# Patient Record
Sex: FEMALE | Race: WHITE | NOT HISPANIC OR LATINO | URBAN - METROPOLITAN AREA
[De-identification: names, ages, dates, MRNs, and addresses within clinical notes are randomized per-mention and may not be internally consistent; named-entity substitution may affect disease eponyms.]

---

## 2023-08-14 ENCOUNTER — HOSPITAL ENCOUNTER (OUTPATIENT)
Dept: DATA CONVERSION | Facility: HOSPITAL | Age: 38
Discharge: HOME | End: 2023-08-14

## 2023-08-14 DIAGNOSIS — D64.9 ANEMIA, UNSPECIFIED: ICD-10-CM

## 2023-08-14 DIAGNOSIS — K90.9 INTESTINAL MALABSORPTION, UNSPECIFIED (HHS-HCC): ICD-10-CM

## 2023-08-14 DIAGNOSIS — E53.8 DEFICIENCY OF OTHER SPECIFIED B GROUP VITAMINS: ICD-10-CM

## 2023-08-14 DIAGNOSIS — E55.9 VITAMIN D DEFICIENCY, UNSPECIFIED: ICD-10-CM

## 2023-08-14 DIAGNOSIS — Z98.84 BARIATRIC SURGERY STATUS: ICD-10-CM

## 2023-08-14 LAB
25(OH)D3 SERPL-MCNC: 23 NG/ML (ref 31–100)
ALBUMIN SERPL-MCNC: 4 GM/DL (ref 3.5–5)
ALBUMIN/GLOB SERPL: 1.4 RATIO (ref 1.5–3)
ALP BLD-CCNC: 63 U/L (ref 35–125)
ALT SERPL-CCNC: 11 U/L (ref 5–40)
ANION GAP SERPL CALCULATED.3IONS-SCNC: 9 MMOL/L (ref 0–19)
AST SERPL-CCNC: 12 U/L (ref 5–40)
BASOPHILS # BLD AUTO: 0.06 K/UL (ref 0–0.22)
BASOPHILS NFR BLD AUTO: 0.7 % (ref 0–1)
BILIRUB SERPL-MCNC: 0.4 MG/DL (ref 0.1–1.2)
BUN SERPL-MCNC: 18 MG/DL (ref 8–25)
BUN/CREAT SERPL: 25.7 RATIO (ref 8–21)
CALCIUM SERPL-MCNC: 9.2 MG/DL (ref 8.5–10.4)
CALCIUM SERPL-MCNC: 9.2 MG/DL (ref 8.5–10.4)
CHLORIDE SERPL-SCNC: 108 MMOL/L (ref 97–107)
CO2 SERPL-SCNC: 24 MMOL/L (ref 24–31)
CREAT SERPL-MCNC: 0.7 MG/DL (ref 0.4–1.6)
DEPRECATED RDW RBC AUTO: 40 FL (ref 37–54)
DIFFERENTIAL METHOD BLD: ABNORMAL
EOSINOPHIL # BLD AUTO: 0.18 K/UL (ref 0–0.45)
EOSINOPHIL NFR BLD: 2.2 % (ref 0–3)
ERYTHROCYTE [DISTWIDTH] IN BLOOD BY AUTOMATED COUNT: 13.4 % (ref 11.7–15)
FERRITIN SERPL-MCNC: 92 NG/ML (ref 13–150)
FOLATE SERPL-MCNC: 18.6 NG/ML (ref 4.2–19.9)
GFR SERPL CREATININE-BSD FRML MDRD: 116 ML/MIN/1.73 M2
GLOBULIN SER-MCNC: 2.8 G/DL (ref 1.9–3.7)
GLUCOSE SERPL-MCNC: 74 MG/DL (ref 65–99)
HCT VFR BLD AUTO: 39.8 % (ref 36–44)
HGB BLD-MCNC: 13 GM/DL (ref 12–15)
IMM GRANULOCYTES # BLD AUTO: 0.02 K/UL (ref 0–0.1)
IRON SATN MFR SERPL: 20.9 % (ref 12–50)
IRON SERPL-MCNC: 47 UG/DL (ref 30–160)
LYMPHOCYTES # BLD AUTO: 2.22 K/UL (ref 1.2–3.2)
LYMPHOCYTES NFR BLD MANUAL: 26.6 % (ref 20–40)
MCH RBC QN AUTO: 27 PG (ref 26–34)
MCHC RBC AUTO-ENTMCNC: 32.7 % (ref 31–37)
MCV RBC AUTO: 82.7 FL (ref 80–100)
MONOCYTES # BLD AUTO: 0.77 K/UL (ref 0–0.8)
MONOCYTES NFR BLD MANUAL: 9.2 % (ref 0–8)
NEUTROPHILS # BLD AUTO: 5.09 K/UL
NEUTROPHILS # BLD AUTO: 5.09 K/UL (ref 1.8–7.7)
NEUTROPHILS.IMMATURE NFR BLD: 0.2 % (ref 0–1)
NEUTS SEG NFR BLD: 61.1 % (ref 50–70)
NRBC BLD-RTO: 0 /100 WBC
PHOSPHATE SERPL-MCNC: 3.6 MG/DL (ref 2.5–4.5)
PLATELET # BLD AUTO: 382 K/UL (ref 150–450)
PMV BLD AUTO: 10.2 CU (ref 7–12.6)
POTASSIUM SERPL-SCNC: 4.4 MMOL/L (ref 3.4–5.1)
PROT SERPL-MCNC: 6.8 G/DL (ref 5.9–7.9)
PTH-INTACT SERPL-MCNC: 26 PG/ML (ref 15–65)
RBC # BLD AUTO: 4.81 M/UL (ref 4–4.9)
SODIUM SERPL-SCNC: 140 MMOL/L (ref 133–145)
TIBC SERPL-MCNC: 225 UG/DL (ref 228–428)
VIT B12 SERPL-MCNC: 961 PG/ML (ref 211–946)
WBC # BLD AUTO: 8.3 K/UL (ref 4.5–11)

## 2023-08-16 LAB
COPPER SERPL-MCNC: NORMAL UG/DL
ZINC SERPL-MCNC: NORMAL UG/ML

## 2023-08-18 LAB — VIT B1 BLD-MCNC: NORMAL UG/DL

## 2023-09-15 VITALS
DIASTOLIC BLOOD PRESSURE: 58 MMHG | SYSTOLIC BLOOD PRESSURE: 103 MMHG | BODY MASS INDEX: 30.7 KG/M2 | HEART RATE: 76 BPM | HEIGHT: 66 IN | WEIGHT: 191 LBS

## 2023-09-18 PROBLEM — R20.0 NUMBNESS: Status: ACTIVE | Noted: 2023-09-18

## 2023-09-18 PROBLEM — E86.1 HYPOVOLEMIA: Status: ACTIVE | Noted: 2023-09-18

## 2023-09-18 PROBLEM — N89.8 VAGINAL IRRITATION: Status: ACTIVE | Noted: 2023-09-18

## 2023-09-18 PROBLEM — E66.01 MORBID (SEVERE) OBESITY DUE TO EXCESS CALORIES (MULTI): Status: ACTIVE | Noted: 2023-09-18

## 2023-09-18 PROBLEM — G47.10 HYPERSOMNIA: Status: ACTIVE | Noted: 2023-09-18

## 2023-09-18 PROBLEM — G44.209 TENSION HEADACHE: Status: ACTIVE | Noted: 2023-09-18

## 2023-09-18 PROBLEM — M13.80 SERONEGATIVE ARTHRITIS: Status: ACTIVE | Noted: 2023-09-18

## 2023-09-18 PROBLEM — N93.9 ABNORMAL UTERINE BLEEDING: Status: ACTIVE | Noted: 2023-09-18

## 2023-09-18 PROBLEM — F32.9 MAJOR DEPRESSIVE DISORDER, SINGLE EPISODE, UNSPECIFIED: Status: ACTIVE | Noted: 2023-09-18

## 2023-09-18 PROBLEM — E78.5 HYPERLIPIDEMIA: Status: ACTIVE | Noted: 2023-09-18

## 2023-09-18 PROBLEM — B37.31 YEAST INFECTION OF THE VAGINA: Status: ACTIVE | Noted: 2023-09-18

## 2023-09-18 PROBLEM — G47.30 SLEEP APNEA: Status: ACTIVE | Noted: 2023-09-18

## 2023-09-18 PROBLEM — E55.9 VITAMIN D DEFICIENCY: Status: ACTIVE | Noted: 2023-09-18

## 2023-09-18 PROBLEM — E03.9 ACQUIRED HYPOTHYROIDISM: Status: ACTIVE | Noted: 2023-09-18

## 2023-09-18 PROBLEM — R20.9 SKIN SENSATION DISTURBANCE: Status: ACTIVE | Noted: 2023-09-18

## 2023-09-18 PROBLEM — O99.210 OBESITY AFFECTING PREGNANCY, ANTEPARTUM (HHS-HCC): Status: ACTIVE | Noted: 2023-09-18

## 2023-09-18 PROBLEM — Z90.49 HISTORY OF CHOLECYSTECTOMY: Status: ACTIVE | Noted: 2023-09-18

## 2023-09-18 PROBLEM — K90.9 ABNORMAL INTESTINAL ABSORPTION (HHS-HCC): Status: ACTIVE | Noted: 2023-09-18

## 2023-09-18 PROBLEM — D50.0 IRON DEFICIENCY ANEMIA DUE TO CHRONIC BLOOD LOSS: Status: ACTIVE | Noted: 2023-09-18

## 2023-09-18 PROBLEM — M62.89 PELVIC FLOOR DYSFUNCTION: Status: ACTIVE | Noted: 2023-09-18

## 2023-09-18 PROBLEM — Z85.3 HISTORY OF BREAST CANCER: Status: ACTIVE | Noted: 2023-09-18

## 2023-09-18 PROBLEM — N95.2 VAGINAL ATROPHY: Status: ACTIVE | Noted: 2023-09-18

## 2023-09-18 PROBLEM — K21.9 GASTROESOPHAGEAL REFLUX DISEASE: Status: ACTIVE | Noted: 2023-09-18

## 2023-09-18 PROBLEM — N92.1 MENORRHAGIA WITH IRREGULAR CYCLE: Status: ACTIVE | Noted: 2023-09-18

## 2023-09-18 PROBLEM — E21.3 HYPERPARATHYROIDISM (MULTI): Status: ACTIVE | Noted: 2023-09-18

## 2023-09-18 PROBLEM — E06.1 SUBACUTE THYROIDITIS: Status: ACTIVE | Noted: 2023-09-18

## 2023-09-18 PROBLEM — E05.00 THYROTOXICOSIS WITH DIFFUSE GOITER WITHOUT THYROTOXIC CRISIS OR STORM: Status: ACTIVE | Noted: 2023-09-18

## 2023-09-18 PROBLEM — E04.9 GOITER: Status: ACTIVE | Noted: 2023-09-18

## 2023-09-18 PROBLEM — N89.8 VAGINAL CYST: Status: ACTIVE | Noted: 2023-09-18

## 2023-09-18 RX ORDER — DROSPIRENONE AND ETHINYL ESTRADIOL 0.03MG-3MG
1 KIT ORAL DAILY
COMMUNITY
Start: 2021-12-28 | End: 2023-10-16

## 2023-09-18 RX ORDER — SERTRALINE HYDROCHLORIDE 25 MG/1
1 TABLET, FILM COATED ORAL DAILY
COMMUNITY
Start: 2022-03-19 | End: 2023-10-16

## 2023-09-18 RX ORDER — PNV NO.95/FERROUS FUM/FOLIC AC 28MG-0.8MG
TABLET ORAL
COMMUNITY
Start: 2021-11-04 | End: 2023-10-16

## 2023-09-18 RX ORDER — IBUPROFEN 200 MG
200 TABLET ORAL
COMMUNITY

## 2023-09-18 RX ORDER — BUPROPION HYDROCHLORIDE 100 MG/1
100 TABLET, EXTENDED RELEASE ORAL EVERY MORNING
COMMUNITY
End: 2023-10-16

## 2023-09-18 RX ORDER — ACETAMINOPHEN 325 MG/1
325 TABLET ORAL EVERY 4 HOURS
COMMUNITY

## 2023-09-18 RX ORDER — OMEPRAZOLE 40 MG/1
40 CAPSULE, DELAYED RELEASE ORAL
COMMUNITY
End: 2023-10-16

## 2023-09-18 RX ORDER — BUPROPION HYDROCHLORIDE 150 MG/1
150 TABLET ORAL DAILY
COMMUNITY
Start: 2022-03-17 | End: 2023-10-16

## 2023-09-18 RX ORDER — ERGOCALCIFEROL 1.25 MG/1
50000 CAPSULE ORAL
COMMUNITY
Start: 2021-04-09

## 2023-10-16 ENCOUNTER — OFFICE VISIT (OUTPATIENT)
Dept: PRIMARY CARE | Facility: CLINIC | Age: 38
End: 2023-10-16
Payer: COMMERCIAL

## 2023-10-16 ENCOUNTER — ANCILLARY PROCEDURE (OUTPATIENT)
Dept: RADIOLOGY | Facility: CLINIC | Age: 38
End: 2023-10-16
Payer: COMMERCIAL

## 2023-10-16 VITALS
BODY MASS INDEX: 31.09 KG/M2 | DIASTOLIC BLOOD PRESSURE: 68 MMHG | WEIGHT: 186.6 LBS | RESPIRATION RATE: 18 BRPM | SYSTOLIC BLOOD PRESSURE: 116 MMHG | TEMPERATURE: 97.5 F | HEIGHT: 65 IN | OXYGEN SATURATION: 96 % | HEART RATE: 73 BPM

## 2023-10-16 DIAGNOSIS — Z13.220 SCREENING FOR LIPID DISORDERS: ICD-10-CM

## 2023-10-16 DIAGNOSIS — M25.562 CHRONIC PAIN OF LEFT KNEE: ICD-10-CM

## 2023-10-16 DIAGNOSIS — Z00.00 WELL ADULT HEALTH CHECK: Primary | ICD-10-CM

## 2023-10-16 DIAGNOSIS — F41.9 ANXIETY: ICD-10-CM

## 2023-10-16 DIAGNOSIS — G89.29 CHRONIC PAIN OF LEFT KNEE: ICD-10-CM

## 2023-10-16 DIAGNOSIS — R53.83 OTHER FATIGUE: ICD-10-CM

## 2023-10-16 DIAGNOSIS — E55.9 VITAMIN D DEFICIENCY: ICD-10-CM

## 2023-10-16 DIAGNOSIS — F32.A DEPRESSION, UNSPECIFIED DEPRESSION TYPE: ICD-10-CM

## 2023-10-16 DIAGNOSIS — B37.2 YEAST DERMATITIS: ICD-10-CM

## 2023-10-16 PROCEDURE — 73562 X-RAY EXAM OF KNEE 3: CPT | Mod: 50

## 2023-10-16 PROCEDURE — 1036F TOBACCO NON-USER: CPT | Performed by: NURSE PRACTITIONER

## 2023-10-16 PROCEDURE — 99385 PREV VISIT NEW AGE 18-39: CPT | Performed by: NURSE PRACTITIONER

## 2023-10-16 RX ORDER — BUPROPION HYDROCHLORIDE 150 MG/1
150 TABLET ORAL EVERY MORNING
Qty: 30 TABLET | Refills: 1 | Status: SHIPPED | OUTPATIENT
Start: 2023-10-16 | End: 2023-12-21

## 2023-10-16 RX ORDER — NYSTATIN 100000 [USP'U]/G
1 POWDER TOPICAL 2 TIMES DAILY
Qty: 30 G | Refills: 11 | Status: SHIPPED | OUTPATIENT
Start: 2023-10-16 | End: 2024-10-15

## 2023-10-16 ASSESSMENT — PATIENT HEALTH QUESTIONNAIRE - PHQ9
1. LITTLE INTEREST OR PLEASURE IN DOING THINGS: SEVERAL DAYS
10. IF YOU CHECKED OFF ANY PROBLEMS, HOW DIFFICULT HAVE THESE PROBLEMS MADE IT FOR YOU TO DO YOUR WORK, TAKE CARE OF THINGS AT HOME, OR GET ALONG WITH OTHER PEOPLE: VERY DIFFICULT
2. FEELING DOWN, DEPRESSED OR HOPELESS: SEVERAL DAYS
SUM OF ALL RESPONSES TO PHQ9 QUESTIONS 1 AND 2: 2

## 2023-10-16 ASSESSMENT — ENCOUNTER SYMPTOMS: TINGLING: 1

## 2023-10-16 ASSESSMENT — PAIN SCALES - GENERAL: PAINLEVEL: 7

## 2023-10-16 NOTE — PROGRESS NOTES
"Subjective   Patient ID: Ju Fernandes is a 38 y.o. female who presents for Annual Exam (PT HERE FOR PHYSICAL. NO PAP. PT WOULD LIKE TO GET BACK ON DEPRESSION MED. /PT C/O ONGOING LEFT KNEE PAIN).HAS HAD GASTRIC BY [facility]ASS IN FEB LOST OVER 75 POUNDS  2YR OLS SLEEPING  HYST  NE MENSES    HERE  FOR CPE. HAS BEEN  OFF MED'S FOR 4 MONTHS. FOR DEPRESSION NOW SHE IS STRUGGLING ,HAD BEEN ON WELLBUTRIN 150. NOW NOTCHING REALLY NOT DOING WELL, WORRIES MORE OFTEN SAD.PT IS A STAY AT HOME MOM. HAS A GOOD SUPPORT SYSTEM  SUPPORTIVE. WORRIES ABOUT  THE  KIDS MONEY. STOPPED WATCHING NEWS. HAS LEFT KNNE PAIN  ONGOING FOR A FEW MONTH. SHARP STABBING , HAS  HAD GASTRIC BY PASS AND HAS LOST A LOT OF WEIGHT ,LOOKS GREAT .SEES DR GORDILLO    Knee Pain   The incident occurred more than 1 week ago. There was no injury mechanism. The pain is present in the left knee. The quality of the pain is described as aching, burning and stabbing. The pain is moderate. The pain has been Worsening since onset. Associated symptoms include tingling. She reports no foreign bodies present. She has tried ice, elevation and NSAIDs for the symptoms. The treatment provided no relief.        Review of Systems   Musculoskeletal:  Positive for arthralgias.   Neurological:  Positive for tingling.   Psychiatric/Behavioral:          INCREASING ANXIETY  MORE INCREASED SADNESS NOT SUCIDAL GOOD SUPPORT SYSTEM        Objective   /68   Pulse 73   Temp 36.4 °C (97.5 °F)   Resp 18   Ht 1.638 m (5' 4.5\")   Wt 84.6 kg (186 lb 9.6 oz)   LMP  (LMP Unknown) Comment: HYSTERECTOMY 5/2022  SpO2 96%   BMI 31.54 kg/m²     Physical Exam  Vitals reviewed.   Constitutional:       Appearance: Normal appearance.   HENT:      Head: Normocephalic.      Right Ear: Tympanic membrane normal.      Left Ear: Tympanic membrane normal.      Nose: Nose normal.   Cardiovascular:      Rate and Rhythm: Normal rate and regular rhythm.      Pulses: Normal pulses.      Heart sounds: " Normal heart sounds.   Pulmonary:      Effort: Pulmonary effort is normal.   Abdominal:      General: Abdomen is flat. Bowel sounds are normal.      Palpations: Abdomen is soft.   Musculoskeletal:         General: Tenderness present.      Cervical back: Normal range of motion.      Left lower leg: Edema present.      Comments: HAS PAIN AND TENDERNESS WITH LEFT KNEE NO SWELLING PAINFUL ROM   Skin:     General: Skin is warm and dry.   Neurological:      Mental Status: She is alert.         Assessment/Plan   Problem List Items Addressed This Visit             ICD-10-CM    Vitamin D deficiency E55.9    Relevant Orders    Vitamin D 25-Hydroxy,Total (for eval of Vitamin D levels)     Other Visit Diagnoses         Codes    Well adult health check    -  Primary Z00.00    Relevant Orders    Comprehensive Metabolic Panel    TSH with reflex to Free T4 if abnormal    Chronic pain of left knee     M25.562, G89.29    Relevant Orders    Referral to Physical Therapy    Referral to Orthopaedic Surgery    XR knee 3 views bilateral    Anxiety     F41.9    Relevant Medications    buPROPion XL (Wellbutrin XL) 150 mg 24 hr tablet    Depression, unspecified depression type     F32.A    Yeast dermatitis     B37.2    Relevant Medications    nystatin (Mycostatin) 100,000 unit/gram powder    Screening for lipid disorders     Z13.220    Relevant Orders    Lipid Panel    Other fatigue     R53.83    Relevant Orders    CBC and Auto Differential

## 2023-10-17 ASSESSMENT — ENCOUNTER SYMPTOMS: ARTHRALGIAS: 1

## 2023-10-17 NOTE — PATIENT INSTRUCTIONS
DISCUSSED RESTARTING MEDICATION CASSIUS CALL OR RETURN IN 4 WEEKS FOR MED CHECK SEE ORTHO AND PT FOR L KNEE LOOKS GREAT

## 2023-12-21 DIAGNOSIS — F41.9 ANXIETY: ICD-10-CM

## 2023-12-21 RX ORDER — BUPROPION HYDROCHLORIDE 150 MG/1
150 TABLET ORAL EVERY MORNING
Qty: 30 TABLET | Refills: 1 | Status: SHIPPED | OUTPATIENT
Start: 2023-12-21 | End: 2024-02-28

## 2024-02-07 NOTE — PROGRESS NOTES
Subjective   Patient ID: Ju Fernandes is a 38 y.o. female who presents for No chief complaint on file..  HPI  1 YR FUV SLEEVE  START WT: 254 IDEAL WT: 153  START EXCESS WT: 101 TOTAL WT LOSS: 74 EWL 73% HT: 65.5   Review of Systems  Bariatric Surgery F/U:          Seen at ER after surgery? No.  Hospital admission? No.  Bariatric reoperation? No.  Bariatric intervention? No.  Was anticoagulation initiated for presumed/confirmed Vein Thrombosis/PE? No.  Was an incisional hernia noted on exam? No.  Sleep Apnea? No.  Still using C-PAP? No.  GERD req. meds? No.  Hyperlipidemia? No.  Still taking Cholesterol med? No.  Hypertension? No.  Still taking HTN med? No.  Number of Anti-hypertensive Medications: 0.  Diabetes? No.  Still taking DM med? No.  Current DM medication type: _____.         CONSTITUTIONAL:          Chills No.  Fatigue No.  Fever No.         CARDIOLOGY:          admits to occasional   dizziness when goes from sitting to standing doctors are aware, chest pain, palpitations, shortness of breath.         RESPIRATORY:          Negative for chest congestion, cough, wheezing.         GASTROENTEROLOGY:          Food Intolerance No.  Acid reflux No.  Abdominal pain No.  Black stools No.  Constipation No.  Diarrhea No.  Loss of appetite no.  Nausea No.  Vomiting No.         UROLOGY:          Negative for dysuria, urinary urgency, kidney stones.         PSYCHOLOGY:          Negative for depression, anxiety, high stress level.   Objective   Physical Exam    Assessment/Plan            Josselyn Rodriguez LPN 02/07/24 3:35 PM

## 2024-02-12 ENCOUNTER — LAB (OUTPATIENT)
Dept: LAB | Facility: LAB | Age: 39
End: 2024-02-12
Payer: COMMERCIAL

## 2024-02-12 DIAGNOSIS — E61.1 IRON DEFICIENCY: ICD-10-CM

## 2024-02-12 DIAGNOSIS — E55.9 VITAMIN D DEFICIENCY, UNSPECIFIED: ICD-10-CM

## 2024-02-12 DIAGNOSIS — K90.9 INTESTINAL MALABSORPTION, UNSPECIFIED (HHS-HCC): Primary | ICD-10-CM

## 2024-02-12 DIAGNOSIS — E21.3 HYPERPARATHYROIDISM, UNSPECIFIED (MULTI): ICD-10-CM

## 2024-02-12 DIAGNOSIS — E53.8 DEFICIENCY OF OTHER SPECIFIED B GROUP VITAMINS: ICD-10-CM

## 2024-02-12 LAB
25(OH)D3 SERPL-MCNC: 62 NG/ML (ref 31–100)
ALBUMIN SERPL-MCNC: 4.4 G/DL (ref 3.5–5)
ALP BLD-CCNC: 67 U/L (ref 35–125)
ALT SERPL-CCNC: 12 U/L (ref 5–40)
ANION GAP SERPL CALC-SCNC: 12 MMOL/L
AST SERPL-CCNC: 13 U/L (ref 5–40)
BASOPHILS # BLD AUTO: 0.08 X10*3/UL (ref 0–0.1)
BASOPHILS NFR BLD AUTO: 0.7 %
BILIRUB SERPL-MCNC: 0.3 MG/DL (ref 0.1–1.2)
BUN SERPL-MCNC: 20 MG/DL (ref 8–25)
CALCIUM SERPL-MCNC: 9.2 MG/DL (ref 8.5–10.4)
CHLORIDE SERPL-SCNC: 102 MMOL/L (ref 97–107)
CO2 SERPL-SCNC: 23 MMOL/L (ref 24–31)
CREAT SERPL-MCNC: 0.8 MG/DL (ref 0.4–1.6)
EGFRCR SERPLBLD CKD-EPI 2021: >90 ML/MIN/1.73M*2
EOSINOPHIL # BLD AUTO: 0.21 X10*3/UL (ref 0–0.7)
EOSINOPHIL NFR BLD AUTO: 1.8 %
ERYTHROCYTE [DISTWIDTH] IN BLOOD BY AUTOMATED COUNT: 13 % (ref 11.5–14.5)
FERRITIN SERPL-MCNC: 107 NG/ML (ref 13–150)
FOLATE SERPL-MCNC: >20 NG/ML (ref 4.2–19.9)
GLUCOSE SERPL-MCNC: 98 MG/DL (ref 65–99)
HCT VFR BLD AUTO: 41.5 % (ref 36–46)
HGB BLD-MCNC: 13.4 G/DL (ref 12–16)
IMM GRANULOCYTES # BLD AUTO: 0.03 X10*3/UL (ref 0–0.7)
IMM GRANULOCYTES NFR BLD AUTO: 0.3 % (ref 0–0.9)
IRON SERPL-MCNC: 52 UG/DL (ref 30–160)
LYMPHOCYTES # BLD AUTO: 2.59 X10*3/UL (ref 1.2–4.8)
LYMPHOCYTES NFR BLD AUTO: 22.2 %
MCH RBC QN AUTO: 27.2 PG (ref 26–34)
MCHC RBC AUTO-ENTMCNC: 32.3 G/DL (ref 32–36)
MCV RBC AUTO: 84 FL (ref 80–100)
MONOCYTES # BLD AUTO: 0.75 X10*3/UL (ref 0.1–1)
MONOCYTES NFR BLD AUTO: 6.4 %
NEUTROPHILS # BLD AUTO: 8.02 X10*3/UL (ref 1.2–7.7)
NEUTROPHILS NFR BLD AUTO: 68.6 %
NRBC BLD-RTO: 0 /100 WBCS (ref 0–0)
PLATELET # BLD AUTO: 416 X10*3/UL (ref 150–450)
POTASSIUM SERPL-SCNC: 4.6 MMOL/L (ref 3.4–5.1)
PROT SERPL-MCNC: 6.8 G/DL (ref 5.9–7.9)
PTH-INTACT SERPL-MCNC: 53.5 PG/ML (ref 18.5–88)
RBC # BLD AUTO: 4.93 X10*6/UL (ref 4–5.2)
SODIUM SERPL-SCNC: 137 MMOL/L (ref 133–145)
VIT B12 SERPL-MCNC: 1541 PG/ML (ref 211–946)
WBC # BLD AUTO: 11.7 X10*3/UL (ref 4.4–11.3)

## 2024-02-12 PROCEDURE — 83540 ASSAY OF IRON: CPT

## 2024-02-12 PROCEDURE — 85025 COMPLETE CBC W/AUTO DIFF WBC: CPT

## 2024-02-12 PROCEDURE — 82746 ASSAY OF FOLIC ACID SERUM: CPT

## 2024-02-12 PROCEDURE — 80053 COMPREHEN METABOLIC PANEL: CPT

## 2024-02-12 PROCEDURE — 84425 ASSAY OF VITAMIN B-1: CPT

## 2024-02-12 PROCEDURE — 83970 ASSAY OF PARATHORMONE: CPT

## 2024-02-12 PROCEDURE — 82728 ASSAY OF FERRITIN: CPT

## 2024-02-12 PROCEDURE — 82525 ASSAY OF COPPER: CPT

## 2024-02-12 PROCEDURE — 82607 VITAMIN B-12: CPT

## 2024-02-12 PROCEDURE — 36415 COLL VENOUS BLD VENIPUNCTURE: CPT

## 2024-02-12 PROCEDURE — 84630 ASSAY OF ZINC: CPT

## 2024-02-12 PROCEDURE — 82306 VITAMIN D 25 HYDROXY: CPT

## 2024-02-15 ENCOUNTER — NUTRITION (OUTPATIENT)
Dept: SURGERY | Facility: CLINIC | Age: 39
End: 2024-02-15

## 2024-02-15 ENCOUNTER — OFFICE VISIT (OUTPATIENT)
Dept: SURGERY | Facility: CLINIC | Age: 39
End: 2024-02-15
Payer: COMMERCIAL

## 2024-02-15 DIAGNOSIS — E53.8 B12 DEFICIENCY: ICD-10-CM

## 2024-02-15 DIAGNOSIS — E55.9 VITAMIN D DEFICIENCY: ICD-10-CM

## 2024-02-15 DIAGNOSIS — Z90.3 S/P GASTRIC SLEEVE PROCEDURE: ICD-10-CM

## 2024-02-15 DIAGNOSIS — K90.9 ABNORMAL INTESTINAL ABSORPTION (HHS-HCC): Primary | ICD-10-CM

## 2024-02-15 DIAGNOSIS — E21.3 HYPERPARATHYROIDISM (MULTI): ICD-10-CM

## 2024-02-15 LAB
COPPER SERPL-MCNC: 104.2 UG/DL (ref 80–155)
ZINC SERPL-MCNC: 64.4 UG/DL (ref 60–120)

## 2024-02-15 PROCEDURE — 99214 OFFICE O/P EST MOD 30 MIN: CPT | Performed by: SURGERY

## 2024-02-15 PROCEDURE — 1036F TOBACCO NON-USER: CPT | Performed by: SURGERY

## 2024-02-15 RX ORDER — BISMUTH SUBSALICYLATE 262 MG
1 TABLET,CHEWABLE ORAL DAILY
COMMUNITY

## 2024-02-15 ASSESSMENT — PAIN SCALES - GENERAL: PAINLEVEL: 6

## 2024-02-15 NOTE — H&P
History Of Present Illness  Ju Fernandes is a 38 y.o. woman here for one year follow up from [facility].  She denies GERD.  She has hunger.  Ju feels full with 1/2 cup.  She is following the rules.  She joined [employer] and works out 4 days.  She does cardio 30 minutes and then lifts weights.  Ju takes bariatric mvi, calcium citrate 500mg chews bid.      START WT: 254 IDEAL WT: 153  START EXCESS WT: 101 TOTAL WT LOSS: 74   EWL   73     % HT: 65.5    Past Medical History  Past Medical History:   Diagnosis Date    Encounter for full-term uncomplicated delivery      (spontaneous vaginal delivery)    Encounter for immunization     COVID-19 vaccine administered    Other specified diseases and conditions complicating pregnancy 08/15/2016    Systemic lupus complicating pregnancy    Personal history of other diseases of the musculoskeletal system and connective tissue 10/15/2020    History of fibromyalgia    Personal history of other endocrine, nutritional and metabolic disease 2020    History of thyroid disease    Systemic lupus erythematosus, unspecified (CMS/[facility]) 2016    Lupus       Surgical History  Past Surgical History:   Procedure Laterality Date    OTHER SURGICAL HISTORY  10/15/2020    Cholecystectomy    OTHER SURGICAL HISTORY  10/15/2020    Tonsillectomy    OTHER SURGICAL HISTORY  2022    Intrauterine device removal    OTHER SURGICAL HISTORY  2022    Endometrial ablation    OTHER SURGICAL HISTORY  2023    GASTRIC SLEEVE        Social History  She reports that she has never smoked. She has never used smokeless tobacco. She reports that she does not currently use alcohol. She reports that she does not use drugs.    Family History  Family History   Problem Relation Name Age of Onset    Diabetes Mother      Breast cancer Mother          currently in remission    No Known Problems Father      Anxiety disorder Sister      Depression Sister      Thyroid cancer Maternal Grandmother       Kidney cancer Maternal Grandmother      No Known Problems Paternal Grandmother      No Known Problems Paternal Grandfather      Kidney failure Other MIsc     Skin cancer Other MIsc     Diabetes Other MIsc     Hypertension Other MIsc     Depression Other MIsc     Fibromyalgia Other MIsc         Allergies  Moxifloxacin    Review of Systems   Bariatric Surgery F/U:          Seen at ER after surgery? No.  Hospital admission? No.  Bariatric reoperation? No.  Bariatric intervention? No.  Was anticoagulation initiated for presumed/confirmed Vein Thrombosis/PE? No.  Was an incisional hernia noted on exam? No.  Sleep Apnea? No.  Still using C-PAP? No.  GERD req. meds? No.  Hyperlipidemia? No.  Still taking Cholesterol med? No.  Hypertension? No.  Still taking HTN med? No.  Number of Anti-hypertensive Medications: 0.  Diabetes? No.  Still taking DM med? No.  Current DM medication type: _____.         CONSTITUTIONAL:          Chills No.  Fatigue No.  Fever No.         CARDIOLOGY:          admits to occasional   dizziness when goes from sitting to standing doctors are aware, chest pain, palpitations, shortness of breath.         RESPIRATORY:          Negative for chest congestion, cough, wheezing.         GASTROENTEROLOGY:          Food Intolerance No.  Acid reflux No.  Abdominal pain No.  Black stools No.  Constipation No.  Diarrhea No.  Loss of appetite no.  Nausea No.  Vomiting No.         UROLOGY:          Negative for dysuria, urinary urgency, kidney stones.         PSYCHOLOGY:          Negative for depression, anxiety, high stress level.            Last Recorded Vitals  There were no vitals taken for this visit.    Relevant Results        Latest Reference Range & Units 02/12/24 13:20   GLUCOSE 65 - 99 mg/dL 98   SODIUM 133 - 145 mmol/L 137   POTASSIUM 3.4 - 5.1 mmol/L 4.6   CHLORIDE 97 - 107 mmol/L 102   Bicarbonate 24 - 31 mmol/L 23 (L)   Anion Gap <=19 mmol/L 12   Blood Urea Nitrogen 8 - 25 mg/dL 20   Creatinine 0.40 -  1.60 mg/dL 0.80   EGFR >60 mL/min/1.73m*2 >90   Calcium 8.5 - 10.4 mg/dL 9.2   Albumin 3.5 - 5.0 g/dL 4.4   Alkaline Phosphatase 35 - 125 U/L 67   ALT 5 - 40 U/L 12   AST 5 - 40 U/L 13   Bilirubin Total 0.1 - 1.2 mg/dL 0.3   FERRITIN 13 - 150 ng/mL 107   FOLATE 4.2 - 19.9 ng/mL >20.0 (H)   Total Protein 5.9 - 7.9 g/dL 6.8   IRON 30 - 160 ug/dL 52   Vitamin B12 211 - 946 pg/mL 1,541 (H)   Parathyroid Hormone, Intact 18.5 - 88.0 pg/mL 53.5   Vitamin D, 25-Hydroxy, Total 31 - 100 ng/mL 62   WBC 4.4 - 11.3 x10*3/uL 11.7 (H)   nRBC 0.0 - 0.0 /100 WBCs 0.0   RBC 4.00 - 5.20 x10*6/uL 4.93   HEMOGLOBIN 12.0 - 16.0 g/dL 13.4   HEMATOCRIT 36.0 - 46.0 % 41.5   MCV 80 - 100 fL 84   MCH 26.0 - 34.0 pg 27.2   MCHC 32.0 - 36.0 g/dL 32.3   RED CELL DISTRIBUTION WIDTH 11.5 - 14.5 % 13.0   Platelets 150 - 450 x10*3/uL 416   Neutrophils % 40.0 - 80.0 % 68.6   Immature Granulocytes %, Automated 0.0 - 0.9 % 0.3   Lymphocytes % 13.0 - 44.0 % 22.2   Monocytes % 2.0 - 10.0 % 6.4   Eosinophils % 0.0 - 6.0 % 1.8   Basophils % 0.0 - 2.0 % 0.7   Neutrophils Absolute 1.20 - 7.70 x10*3/uL 8.02 (H)   Immature Granulocytes Absolute, Automated 0.00 - 0.70 x10*3/uL 0.03   Lymphocytes Absolute 1.20 - 4.80 x10*3/uL 2.59   Monocytes Absolute 0.10 - 1.00 x10*3/uL 0.75   Eosinophils Absolute 0.00 - 0.70 x10*3/uL 0.21   Basophils Absolute 0.00 - 0.10 x10*3/uL 0.08   (L): Data is abnormally low  (H): Data is abnormally high  Assessment/Plan   Problem List Items Addressed This Visit             ICD-10-CM       Endocrine/Metabolic    Hyperparathyroidism (CMS/HCC) E21.3    Vitamin D deficiency E55.9    S/P gastric sleeve procedure Z90.3    B12 deficiency E53.8       Gastrointestinal and Abdominal    Abnormal intestinal absorption - Primary K90.9       We reviewed the rules necessary for long term success after weight loss surgery.  We discussed the need for lifelong nutritional supplementation after weight loss surgery and they were given a handout.  We will  obtain labwork at next visit.  We discussed the importance of excercising with moderate intensity a minimum of five days per week for at least 30 minutes.         Darian Rushing MD

## 2024-02-15 NOTE — PROGRESS NOTES
Bariatric Post-Op Follow Up Appointment: 1 yr sleeve     Current Weight: 180 lb  Current BMI: 29.50  Percentage of weight loss achieved: 73    Dietary Intake: protein shake + 2 meals   Breakfast- coffee with an added protein shake   Lunch- deli meat and cheese stuffed bell pepper, OR a healthy choice meal OR leftovers from dinner   Dinner- taco pasta (taco seasoned pasta with ground beef)   Volume of food at a time: 3-4 oz protein and 1/4 cup vegetables  Snacks: a cheese stick on occ   Beverages: water, coffee with protein shake  Alcohol Intake: on occasion  Do you drink with your meals? No   Current Exercise: cardio/weight lifting at [employer], 4x/week for an hour   Vitamins/minerals: Sh[employer] Leaf MVI (provides 45mg iron, 75 mcg vitamin D, 2mg copper, 1,000 mcg vitamin B12) and calcium citrate   Food intolerances? No   Any decrease in energy levels? No   Any questions or concerns? Addressed all questions.       Twyla Mathias RD, LDN  Registered Dietitian, Licensed Dietitian Nutritionist

## 2024-02-16 VITALS
BODY MASS INDEX: 28.93 KG/M2 | DIASTOLIC BLOOD PRESSURE: 68 MMHG | WEIGHT: 180 LBS | HEIGHT: 66 IN | SYSTOLIC BLOOD PRESSURE: 119 MMHG | HEART RATE: 76 BPM

## 2024-02-16 LAB — VIT B1 PYROPHOSHATE BLD-SCNC: 191 NMOL/L (ref 70–180)

## 2024-02-16 ASSESSMENT — PATIENT HEALTH QUESTIONNAIRE - PHQ9
2. FEELING DOWN, DEPRESSED OR HOPELESS: NOT AT ALL
1. LITTLE INTEREST OR PLEASURE IN DOING THINGS: NOT AT ALL
SUM OF ALL RESPONSES TO PHQ9 QUESTIONS 1 AND 2: 0

## 2024-02-16 ASSESSMENT — COLUMBIA-SUICIDE SEVERITY RATING SCALE - C-SSRS
6. HAVE YOU EVER DONE ANYTHING, STARTED TO DO ANYTHING, OR PREPARED TO DO ANYTHING TO END YOUR LIFE?: NO
2. HAVE YOU ACTUALLY HAD ANY THOUGHTS OF KILLING YOURSELF?: NO
1. IN THE PAST MONTH, HAVE YOU WISHED YOU WERE DEAD OR WISHED YOU COULD GO TO SLEEP AND NOT WAKE UP?: NO

## 2024-02-28 DIAGNOSIS — F41.9 ANXIETY: ICD-10-CM

## 2024-02-28 PROBLEM — Z90.3 S/P GASTRIC SLEEVE PROCEDURE: Status: ACTIVE | Noted: 2024-02-28

## 2024-02-28 PROBLEM — E53.8 B12 DEFICIENCY: Status: ACTIVE | Noted: 2024-02-28

## 2024-02-28 RX ORDER — BUPROPION HYDROCHLORIDE 150 MG/1
150 TABLET ORAL EVERY MORNING
Qty: 30 TABLET | Refills: 1 | Status: SHIPPED | OUTPATIENT
Start: 2024-02-28 | End: 2024-03-25

## 2024-03-25 DIAGNOSIS — F41.9 ANXIETY: ICD-10-CM

## 2024-03-25 RX ORDER — BUPROPION HYDROCHLORIDE 150 MG/1
150 TABLET ORAL EVERY MORNING
Qty: 30 TABLET | Refills: 1 | Status: SHIPPED | OUTPATIENT
Start: 2024-03-25 | End: 2024-05-24

## 2024-03-25 RX ORDER — BUPROPION HYDROCHLORIDE 150 MG/1
150 TABLET ORAL EVERY MORNING
Qty: 30 TABLET | Refills: 1 | Status: SHIPPED | OUTPATIENT
Start: 2024-03-25 | End: 2024-03-25

## 2024-04-11 ENCOUNTER — EVALUATION (OUTPATIENT)
Dept: PHYSICAL THERAPY | Facility: CLINIC | Age: 39
End: 2024-04-11
Payer: COMMERCIAL

## 2024-04-11 DIAGNOSIS — G89.29 OTHER CHRONIC PAIN: ICD-10-CM

## 2024-04-11 DIAGNOSIS — M25.562 PAIN IN LEFT KNEE: ICD-10-CM

## 2024-04-11 PROCEDURE — 97110 THERAPEUTIC EXERCISES: CPT | Mod: GP | Performed by: PHYSICAL THERAPIST

## 2024-04-11 PROCEDURE — 97161 PT EVAL LOW COMPLEX 20 MIN: CPT | Mod: GP | Performed by: PHYSICAL THERAPIST

## 2024-04-11 PROCEDURE — 97530 THERAPEUTIC ACTIVITIES: CPT | Mod: GP | Performed by: PHYSICAL THERAPIST

## 2024-04-11 ASSESSMENT — ENCOUNTER SYMPTOMS
DEPRESSION: 0
LOSS OF SENSATION IN FEET: 0
OCCASIONAL FEELINGS OF UNSTEADINESS: 0

## 2024-04-11 NOTE — PROGRESS NOTES
Physical Therapy Evaluation and Treatment      Patient Name: Ju Fernandes  MRN: [id number]  Today's Date: 4/11/2024  Time Calculation  Start Time: 1600  Stop Time: 1639  Time Calculation (min): 39 min  PT Therapeutic Procedures Time Entry  Therapeutic Exercise Time Entry: 15  Therapeutic Activity Time Entry: 10      Insurance:  Visit number: 1 of 5  Authorization info: 40V/yr  Insurance Type: Payor: "[employer] / Plan: "[employer] HEALTH PLAN / Product Type: *No Product type* /     Current Problem:   1. Pain in left knee  PT eval and treat    Follow Up In Physical Therapy      2. Other chronic pain  PT eval and treat          Subjective    General:  Pt states her left knee is very painful and steadily getting worse for the past year. Xray normal, MRI scheduled. Surgeon thinks she needs VMO strengthening. Left knee pops and cracks and finds it is hard to carry 5 y.o. (MOM of 6). No specific injury, but thinks it is due to carrying her kids over the years. 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 Pain mainly when left knee crossed or bent too long, or when putting weight on it. No numbness or tingling. Pain mainly behind left kneecap. Treadmill walking is OK, but pain with walking outside on harder surface. Has a gym membership.     Would like to become more active as she had gastric sleeve surgery 1 year ago.     Precautions: None  Pain: 6/10      Objective   ROM   Left knee AROM:  Flex 118 deg  Ext 0 deg     MMT   Left LE strength:  Hip flex 4/5  Knee flex 4+/5  Knee ext 4/5  *Pain with strength testing    Palpation   TTP left patella.   TTP left medial knee joint line.   TTP left distal quad, especially VMO.     No edema.      Flexibility  Mild tightness in left quad, increased distally.     Special Tests   Knee: Mague: Left negative, Valgus: Left negative, Varus: Left negative, PF Grind: Left positive     Gait   Ambulates with mildly decreased candice. Decreased stance time on left with intermittent hyperextension.    Stairs   Ascends and  descends steps with reciprocal pattern using 1 rail with pain    Outcome Measures:  LEFS: 47/80    Treatments:  Therapeutic Exercise:  SLR with ER  Bridge  LAQ with ER  Mini squat    Therapeutic activity:  Educated pt on eval findings and POC  Educated pt on anatomy of knee.      Assessment   Assessment:   Pt is a 38 y.o. female with left patellofemoral syndrome. Pt with gait deficits, reduced LE strength, posture deficits, and flexibility restrictions. Pt will benefit from skilled PT to address the above deficits for return to full activities.       Low complexity due to patient's clinical presentation being stable and uncomplicated by any significant comorbidities that may affect rehab tolerance and progression.       Plan:   Treatment/Interventions: Education/ Instruction, Gait training, Manual therapy, Neuromuscular re-education, Self care/ home management, Therapeutic activities, Therapeutic exercises, Taping techniques  PT Plan: Skilled PT  PT Frequency: 1 time per week  Duration: 4 more visits  Rehab Potential: Good  Plan of Care Agreement: Patient      Goals:   Active       Mobility       Goal 1       Start:  04/11/24    Expected End:  07/10/24       Pt will improve left LE strength to 5/5 to improve IADLs.         Goal 2       Start:  04/11/24    Expected End:  07/10/24       Pt will improve LE flexibility to WNL to improve IADLs.            Pain       Goal 1       Start:  04/11/24    Expected End:  07/10/24       Pt will perform all caretaker tasks with 0/10 pain.         Goal 2       Start:  04/11/24    Expected End:  07/10/24       Pt will perform all housework with 0/10 pain

## 2024-04-19 ENCOUNTER — HOSPITAL ENCOUNTER (OUTPATIENT)
Dept: RADIOLOGY | Facility: HOSPITAL | Age: 39
Discharge: HOME | End: 2024-04-19
Payer: COMMERCIAL

## 2024-04-19 DIAGNOSIS — S83.222A PERIPHERAL TEAR OF MEDIAL MENISCUS, CURRENT INJURY, LEFT KNEE, INITIAL ENCOUNTER: ICD-10-CM

## 2024-04-19 PROCEDURE — 73721 MRI JNT OF LWR EXTRE W/O DYE: CPT | Mod: LT

## 2024-04-19 PROCEDURE — 73721 MRI JNT OF LWR EXTRE W/O DYE: CPT | Mod: LEFT SIDE | Performed by: RADIOLOGY

## 2024-05-08 ENCOUNTER — DOCUMENTATION (OUTPATIENT)
Dept: PHYSICAL THERAPY | Facility: CLINIC | Age: 39
End: 2024-05-08
Payer: COMMERCIAL

## 2024-05-08 NOTE — PROGRESS NOTES
Physical Therapy    Discharge Summary    Name: Ju Fernandes  MRN: [id number]  : 1985  Date: 24    Discharge Summary: PT    Discharge Information: Date of evaluation 2024    Therapy Summary: Pt only attended initial eval.    Discharge Status: Return to PCP as needed.     Rehab Discharge Reason: Failed to schedule and/or keep follow-up appointment(s)

## 2024-08-15 ENCOUNTER — APPOINTMENT (OUTPATIENT)
Dept: SURGERY | Facility: CLINIC | Age: 39
End: 2024-08-15
Payer: COMMERCIAL

## 2024-08-19 ENCOUNTER — APPOINTMENT (OUTPATIENT)
Dept: SURGERY | Facility: CLINIC | Age: 39
End: 2024-08-19
Payer: COMMERCIAL

## 2024-10-02 DIAGNOSIS — F41.9 ANXIETY: ICD-10-CM

## 2024-10-02 RX ORDER — BUPROPION HYDROCHLORIDE 150 MG/1
150 TABLET ORAL EVERY MORNING
Qty: 90 TABLET | Refills: 1 | Status: SHIPPED | OUTPATIENT
Start: 2024-10-02 | End: 2025-03-31

## 2024-10-14 DIAGNOSIS — E55.9 VITAMIN D DEFICIENCY, UNSPECIFIED: ICD-10-CM

## 2024-10-14 RX ORDER — ERGOCALCIFEROL 1.25 MG/1
CAPSULE ORAL
Qty: 12 CAPSULE | Refills: 3 | Status: SHIPPED | OUTPATIENT
Start: 2024-10-14

## 2025-02-13 ENCOUNTER — APPOINTMENT (OUTPATIENT)
Dept: SURGERY | Facility: CLINIC | Age: 40
End: 2025-02-13
Payer: COMMERCIAL

## 2025-02-20 ENCOUNTER — OFFICE VISIT (OUTPATIENT)
Dept: PRIMARY CARE | Facility: CLINIC | Age: 40
End: 2025-02-20
Payer: COMMERCIAL

## 2025-02-20 VITALS
HEIGHT: 65 IN | WEIGHT: 188.8 LBS | DIASTOLIC BLOOD PRESSURE: 80 MMHG | RESPIRATION RATE: 18 BRPM | TEMPERATURE: 97.8 F | SYSTOLIC BLOOD PRESSURE: 122 MMHG | BODY MASS INDEX: 31.46 KG/M2 | OXYGEN SATURATION: 98 % | HEART RATE: 69 BPM

## 2025-02-20 DIAGNOSIS — Z13.21 ENCOUNTER FOR VITAMIN DEFICIENCY SCREENING: ICD-10-CM

## 2025-02-20 DIAGNOSIS — E55.9 VITAMIN D DEFICIENCY, UNSPECIFIED: ICD-10-CM

## 2025-02-20 DIAGNOSIS — Z13.220 ENCOUNTER FOR SCREENING FOR LIPID DISORDER: ICD-10-CM

## 2025-02-20 DIAGNOSIS — E55.9 VITAMIN D DEFICIENCY: ICD-10-CM

## 2025-02-20 DIAGNOSIS — F41.9 ANXIETY: ICD-10-CM

## 2025-02-20 DIAGNOSIS — Z11.59 ENCOUNTER FOR HEPATITIS C SCREENING TEST FOR LOW RISK PATIENT: ICD-10-CM

## 2025-02-20 DIAGNOSIS — Z00.00 WELL ADULT EXAM: ICD-10-CM

## 2025-02-20 DIAGNOSIS — Z80.3 FAMILY HISTORY OF BREAST CANCER: Primary | ICD-10-CM

## 2025-02-20 DIAGNOSIS — Z13.29 SCREENING FOR THYROID DISORDER: ICD-10-CM

## 2025-02-20 DIAGNOSIS — N89.8 VAGINAL DRYNESS: ICD-10-CM

## 2025-02-20 DIAGNOSIS — Z98.84 H/O GASTRIC BYPASS: ICD-10-CM

## 2025-02-20 PROCEDURE — 99213 OFFICE O/P EST LOW 20 MIN: CPT | Performed by: NURSE PRACTITIONER

## 2025-02-20 PROCEDURE — 99395 PREV VISIT EST AGE 18-39: CPT | Performed by: NURSE PRACTITIONER

## 2025-02-20 PROCEDURE — 3008F BODY MASS INDEX DOCD: CPT | Performed by: NURSE PRACTITIONER

## 2025-02-20 RX ORDER — ESTRADIOL 10 UG/1
10 TABLET, FILM COATED VAGINAL DAILY
Qty: 14 TABLET | Refills: 0 | Status: SHIPPED | OUTPATIENT
Start: 2025-02-20

## 2025-02-20 RX ORDER — SERTRALINE HYDROCHLORIDE 50 MG/1
50 TABLET, FILM COATED ORAL DAILY
Qty: 30 TABLET | Refills: 1 | Status: SHIPPED | OUTPATIENT
Start: 2025-02-20 | End: 2025-04-21

## 2025-02-20 RX ORDER — ESTRADIOL 10 UG/1
TABLET, FILM COATED VAGINAL
Qty: 32 TABLET | Refills: 3 | Status: SHIPPED | OUTPATIENT
Start: 2025-02-20

## 2025-02-20 RX ORDER — ERGOCALCIFEROL 1.25 MG/1
50000 CAPSULE ORAL WEEKLY
Qty: 12 CAPSULE | Refills: 3 | Status: SHIPPED | OUTPATIENT
Start: 2025-02-20

## 2025-02-20 ASSESSMENT — PATIENT HEALTH QUESTIONNAIRE - PHQ9
1. LITTLE INTEREST OR PLEASURE IN DOING THINGS: SEVERAL DAYS
10. IF YOU CHECKED OFF ANY PROBLEMS, HOW DIFFICULT HAVE THESE PROBLEMS MADE IT FOR YOU TO DO YOUR WORK, TAKE CARE OF THINGS AT HOME, OR GET ALONG WITH OTHER PEOPLE: NOT DIFFICULT AT ALL
2. FEELING DOWN, DEPRESSED OR HOPELESS: SEVERAL DAYS
SUM OF ALL RESPONSES TO PHQ9 QUESTIONS 1 AND 2: 2

## 2025-02-20 ASSESSMENT — PAIN SCALES - GENERAL: PAINLEVEL_OUTOF10: 0-NO PAIN

## 2025-02-20 NOTE — PROGRESS NOTES
"Subjective   Patient ID: Ju Fernandes is a 39 y.o. female who presents for Annual Exam (PT IS HERE FOR ANNUAL PHYSICAL EXAM ).    HPI     Review of Systems    Objective   /80   Pulse 69   Temp 36.6 °C (97.8 °F)   Resp 18   Ht 1.651 m (5' 5\")   Wt 85.6 kg (188 lb 12.8 oz)   LMP  (LMP Unknown) Comment: HYSTERECTOMY 5/2022  SpO2 98%   BMI 31.42 kg/m²     Physical Exam  Vitals and nursing note reviewed.   Constitutional:       General: She is not in acute distress.  HENT:      Right Ear: Tympanic membrane and ear canal normal.      Left Ear: Tympanic membrane and ear canal normal.      Nose: Nose normal. No rhinorrhea.      Mouth/Throat:      Pharynx: Oropharynx is clear. No oropharyngeal exudate or posterior oropharyngeal erythema.      Comments: Dentition wnl  Eyes:      Extraocular Movements: Extraocular movements intact.      Conjunctiva/sclera: Conjunctivae normal.      Pupils: Pupils are equal, round, and reactive to light.   Neck:      Vascular: No carotid bruit.   Cardiovascular:      Rate and Rhythm: Normal rate and regular rhythm.      Heart sounds: Normal heart sounds. No murmur heard.  Pulmonary:      Breath sounds: Normal breath sounds. No wheezing or rhonchi.   Abdominal:      General: Bowel sounds are normal. There is no distension.      Palpations: Abdomen is soft. There is no mass.      Tenderness: There is no abdominal tenderness. There is no guarding or rebound.      Hernia: No hernia is present.   Genitourinary:     Comments: BREASTEXAM NL  Musculoskeletal:         General: No swelling or tenderness. Normal range of motion.      Cervical back: Normal range of motion and neck supple.   Lymphadenopathy:      Cervical: No cervical adenopathy.   Skin:     General: Skin is warm.      Findings: No rash.   Neurological:      General: No focal deficit present.      Mental Status: She is alert and oriented to person, place, and time.   Psychiatric:         Behavior: Behavior normal.      " Comments: Discussed increased depression No data recorded ot been on zoloft for awhile discussed restarting it  agreable not sucidal  just has been Down a lot         Assessment/Plan   Problem List Items Addressed This Visit             ICD-10-CM    Vitamin D deficiency E55.9     Other Visit Diagnoses         Codes    Family history of breast cancer    -  Primary Z80.3    Relevant Orders    BI mammo bilateral screening tomosynthesis    Comprehensive Metabolic Panel    Vaginal dryness     N89.8    Relevant Medications    estradiol (Vagifem) 10 mcg tablet vaginal tablet    estradiol (Vagifem) 10 mcg tablet vaginal tablet    Other Relevant Orders    Iron and TIBC    Ferritin    Copper, Blood    Vitamin B12    Zinc    Hepatitis C antibody    Well adult exam     Z00.00    Relevant Orders    CBC and Auto Differential    Screening for thyroid disorder     Z13.29    Relevant Orders    TSH with reflex to Free T4 if abnormal    PTH, intact    Encounter for screening for lipid disorder     Z13.220    Relevant Orders    Lipid Panel    Encounter for vitamin deficiency screening     Z13.21    Relevant Orders    Zinc    PTH, intact    Encounter for hepatitis C screening test for low risk patient     Z11.59    Relevant Orders    Hepatitis C antibody    Anxiety     F41.9    Relevant Medications    sertraline (Zoloft) 50 mg tablet    Vitamin D deficiency, unspecified     E55.9    Relevant Medications    ergocalciferol (Vitamin D-2) 1250 mcg (50,000 units) capsule    H/O gastric bypass     Z98.84    Relevant Orders    Referral to General Surgery          Great to see you , emma or return in 6 weks adjustments made for anxiety depression

## 2025-02-23 LAB
ALBUMIN SERPL-MCNC: 4.6 G/DL (ref 3.6–5.1)
ALP SERPL-CCNC: 58 U/L (ref 31–125)
ALT SERPL-CCNC: 15 U/L (ref 6–29)
ANION GAP SERPL CALCULATED.4IONS-SCNC: 9 MMOL/L (CALC) (ref 7–17)
AST SERPL-CCNC: 16 U/L (ref 10–30)
BASOPHILS # BLD AUTO: 156 CELLS/UL (ref 0–200)
BASOPHILS NFR BLD AUTO: 2.3 %
BILIRUB SERPL-MCNC: 0.4 MG/DL (ref 0.2–1.2)
BUN SERPL-MCNC: 14 MG/DL (ref 7–25)
CALCIUM SERPL-MCNC: 9.1 MG/DL (ref 8.6–10.2)
CHLORIDE SERPL-SCNC: 105 MMOL/L (ref 98–110)
CHOLEST SERPL-MCNC: 167 MG/DL
CHOLEST/HDLC SERPL: 2.7 (CALC)
CO2 SERPL-SCNC: 25 MMOL/L (ref 20–32)
COPPER BLD-MCNC: NORMAL UG/DL
CREAT SERPL-MCNC: 0.71 MG/DL (ref 0.5–0.97)
EGFRCR SERPLBLD CKD-EPI 2021: 112 ML/MIN/1.73M2
EOSINOPHIL # BLD AUTO: 197 CELLS/UL (ref 15–500)
EOSINOPHIL NFR BLD AUTO: 2.9 %
ERYTHROCYTE [DISTWIDTH] IN BLOOD BY AUTOMATED COUNT: 12.9 % (ref 11–15)
FERRITIN SERPL-MCNC: 72 NG/ML (ref 16–154)
GLUCOSE SERPL-MCNC: 84 MG/DL (ref 65–99)
HCT VFR BLD AUTO: 43.3 % (ref 35–45)
HCV AB SERPL QL IA: NORMAL
HDLC SERPL-MCNC: 63 MG/DL
HGB BLD-MCNC: 13.9 G/DL (ref 11.7–15.5)
IRON SATN MFR SERPL: 28 % (CALC) (ref 16–45)
IRON SERPL-MCNC: 82 MCG/DL (ref 40–190)
LDLC SERPL CALC-MCNC: 81 MG/DL (CALC)
LYMPHOCYTES # BLD AUTO: 2951 CELLS/UL (ref 850–3900)
LYMPHOCYTES NFR BLD AUTO: 43.4 %
MCH RBC QN AUTO: 26.8 PG (ref 27–33)
MCHC RBC AUTO-ENTMCNC: 32.1 G/DL (ref 32–36)
MCV RBC AUTO: 83.4 FL (ref 80–100)
MONOCYTES # BLD AUTO: 1306 CELLS/UL (ref 200–950)
MONOCYTES NFR BLD AUTO: 19.2 %
NEUTROPHILS # BLD AUTO: 2190 CELLS/UL (ref 1500–7800)
NEUTROPHILS NFR BLD AUTO: 32.2 %
NONHDLC SERPL-MCNC: 104 MG/DL (CALC)
PLATELET # BLD AUTO: 353 THOUSAND/UL (ref 140–400)
PMV BLD REES-ECKER: 10.2 FL (ref 7.5–12.5)
POTASSIUM SERPL-SCNC: 4 MMOL/L (ref 3.5–5.3)
PROT SERPL-MCNC: 7.2 G/DL (ref 6.1–8.1)
PTH-INTACT SERPL-MCNC: 38 PG/ML (ref 16–77)
RBC # BLD AUTO: 5.19 MILLION/UL (ref 3.8–5.1)
SODIUM SERPL-SCNC: 139 MMOL/L (ref 135–146)
TIBC SERPL-MCNC: 288 MCG/DL (CALC) (ref 250–450)
TRIGL SERPL-MCNC: 133 MG/DL
TSH SERPL-ACNC: 2.26 MIU/L
VIT B12 SERPL-MCNC: 1287 PG/ML (ref 200–1100)
WBC # BLD AUTO: 6.8 THOUSAND/UL (ref 3.8–10.8)
ZINC SERPL-MCNC: NORMAL UG/ML

## 2025-02-24 LAB
PTH-INTACT SERPL-MCNC: 38 PG/ML (ref 16–77)
ZINC SERPL-MCNC: NORMAL MCG/DL

## 2025-02-26 LAB
ALBUMIN SERPL-MCNC: 4.6 G/DL (ref 3.6–5.1)
ALP SERPL-CCNC: 58 U/L (ref 31–125)
ALT SERPL-CCNC: 15 U/L (ref 6–29)
ANION GAP SERPL CALCULATED.4IONS-SCNC: 9 MMOL/L (CALC) (ref 7–17)
AST SERPL-CCNC: 16 U/L (ref 10–30)
BASOPHILS # BLD AUTO: 156 CELLS/UL (ref 0–200)
BASOPHILS NFR BLD AUTO: 2.3 %
BILIRUB SERPL-MCNC: 0.4 MG/DL (ref 0.2–1.2)
BUN SERPL-MCNC: 14 MG/DL (ref 7–25)
CALCIUM SERPL-MCNC: 9.1 MG/DL (ref 8.6–10.2)
CHLORIDE SERPL-SCNC: 105 MMOL/L (ref 98–110)
CHOLEST SERPL-MCNC: 167 MG/DL
CHOLEST/HDLC SERPL: 2.7 (CALC)
CO2 SERPL-SCNC: 25 MMOL/L (ref 20–32)
COPPER BLD-MCNC: 88 MCG/DL
CREAT SERPL-MCNC: 0.71 MG/DL (ref 0.5–0.97)
EGFRCR SERPLBLD CKD-EPI 2021: 112 ML/MIN/1.73M2
EOSINOPHIL # BLD AUTO: 197 CELLS/UL (ref 15–500)
EOSINOPHIL NFR BLD AUTO: 2.9 %
ERYTHROCYTE [DISTWIDTH] IN BLOOD BY AUTOMATED COUNT: 12.9 % (ref 11–15)
FERRITIN SERPL-MCNC: 72 NG/ML (ref 16–154)
GLUCOSE SERPL-MCNC: 84 MG/DL (ref 65–99)
HCT VFR BLD AUTO: 43.3 % (ref 35–45)
HCV AB SERPL QL IA: NORMAL
HDLC SERPL-MCNC: 63 MG/DL
HGB BLD-MCNC: 13.9 G/DL (ref 11.7–15.5)
IRON SATN MFR SERPL: 28 % (CALC) (ref 16–45)
IRON SERPL-MCNC: 82 MCG/DL (ref 40–190)
LDLC SERPL CALC-MCNC: 81 MG/DL (CALC)
LYMPHOCYTES # BLD AUTO: 2951 CELLS/UL (ref 850–3900)
LYMPHOCYTES NFR BLD AUTO: 43.4 %
MCH RBC QN AUTO: 26.8 PG (ref 27–33)
MCHC RBC AUTO-ENTMCNC: 32.1 G/DL (ref 32–36)
MCV RBC AUTO: 83.4 FL (ref 80–100)
MONOCYTES # BLD AUTO: 1306 CELLS/UL (ref 200–950)
MONOCYTES NFR BLD AUTO: 19.2 %
NEUTROPHILS # BLD AUTO: 2190 CELLS/UL (ref 1500–7800)
NEUTROPHILS NFR BLD AUTO: 32.2 %
NONHDLC SERPL-MCNC: 104 MG/DL (CALC)
PLATELET # BLD AUTO: 353 THOUSAND/UL (ref 140–400)
PMV BLD REES-ECKER: 10.2 FL (ref 7.5–12.5)
POTASSIUM SERPL-SCNC: 4 MMOL/L (ref 3.5–5.3)
PROT SERPL-MCNC: 7.2 G/DL (ref 6.1–8.1)
RBC # BLD AUTO: 5.19 MILLION/UL (ref 3.8–5.1)
SODIUM SERPL-SCNC: 139 MMOL/L (ref 135–146)
TIBC SERPL-MCNC: 288 MCG/DL (CALC) (ref 250–450)
TRIGL SERPL-MCNC: 133 MG/DL
TSH SERPL-ACNC: 2.26 MIU/L
VIT B12 SERPL-MCNC: 1287 PG/ML (ref 200–1100)
WBC # BLD AUTO: 6.8 THOUSAND/UL (ref 3.8–10.8)

## 2025-02-28 ENCOUNTER — HOSPITAL ENCOUNTER (OUTPATIENT)
Dept: RADIOLOGY | Facility: HOSPITAL | Age: 40
Discharge: HOME | End: 2025-02-28
Payer: COMMERCIAL

## 2025-02-28 DIAGNOSIS — Z80.3 FAMILY HISTORY OF BREAST CANCER: ICD-10-CM

## 2025-02-28 PROCEDURE — 77063 BREAST TOMOSYNTHESIS BI: CPT

## 2025-03-16 DIAGNOSIS — F41.9 ANXIETY: ICD-10-CM

## 2025-03-17 RX ORDER — SERTRALINE HYDROCHLORIDE 50 MG/1
50 TABLET, FILM COATED ORAL DAILY
Qty: 30 TABLET | Refills: 11 | Status: SHIPPED | OUTPATIENT
Start: 2025-03-17

## 2025-03-28 ENCOUNTER — APPOINTMENT (OUTPATIENT)
Dept: SURGERY | Facility: CLINIC | Age: 40
End: 2025-03-28
Payer: COMMERCIAL

## 2025-04-24 DIAGNOSIS — F41.9 ANXIETY: ICD-10-CM

## 2025-04-24 RX ORDER — SERTRALINE HYDROCHLORIDE 50 MG/1
50 TABLET, FILM COATED ORAL DAILY
Qty: 90 TABLET | Refills: 3 | Status: SHIPPED | OUTPATIENT
Start: 2025-04-24 | End: 2026-04-24

## 2025-04-26 RX ORDER — BUPROPION HYDROCHLORIDE 150 MG/1
150 TABLET ORAL EVERY MORNING
Qty: 90 TABLET | Refills: 1 | Status: SHIPPED | OUTPATIENT
Start: 2025-04-26 | End: 2025-10-23

## 2025-08-01 ENCOUNTER — PATIENT MESSAGE (OUTPATIENT)
Dept: SURGERY | Facility: CLINIC | Age: 40
End: 2025-08-01

## 2025-08-01 ENCOUNTER — TELEMEDICINE (OUTPATIENT)
Dept: SURGERY | Facility: CLINIC | Age: 40
End: 2025-08-01
Payer: COMMERCIAL

## 2025-08-01 VITALS — WEIGHT: 195 LBS | BODY MASS INDEX: 32.45 KG/M2

## 2025-08-01 DIAGNOSIS — D50.0 IRON DEFICIENCY ANEMIA DUE TO CHRONIC BLOOD LOSS: ICD-10-CM

## 2025-08-01 DIAGNOSIS — G47.33 OBSTRUCTIVE SLEEP APNEA SYNDROME: ICD-10-CM

## 2025-08-01 DIAGNOSIS — K90.9 ABNORMAL INTESTINAL ABSORPTION (HHS-HCC): Primary | ICD-10-CM

## 2025-08-01 DIAGNOSIS — Z90.3 S/P GASTRIC SLEEVE PROCEDURE: ICD-10-CM

## 2025-08-01 DIAGNOSIS — E55.9 VITAMIN D DEFICIENCY: ICD-10-CM

## 2025-08-01 DIAGNOSIS — E53.8 B12 DEFICIENCY: ICD-10-CM

## 2025-08-01 DIAGNOSIS — E21.3 HYPERPARATHYROIDISM (MULTI): ICD-10-CM

## 2025-08-01 DIAGNOSIS — E78.5 HYPERLIPIDEMIA, UNSPECIFIED HYPERLIPIDEMIA TYPE: ICD-10-CM

## 2025-08-01 PROCEDURE — 99214 OFFICE O/P EST MOD 30 MIN: CPT

## 2025-08-01 PROCEDURE — 1036F TOBACCO NON-USER: CPT

## 2025-08-01 PROCEDURE — 99214 OFFICE O/P EST MOD 30 MIN: CPT | Mod: GT,95

## 2025-08-01 RX ORDER — NALTREXONE HYDROCHLORIDE 50 MG/1
25 TABLET, FILM COATED ORAL DAILY
Qty: 15 TABLET | Refills: 3 | Status: SHIPPED | OUTPATIENT
Start: 2025-08-01 | End: 2025-11-29

## 2025-08-01 RX ORDER — SEMAGLUTIDE 0.25 MG/.5ML
0.25 INJECTION, SOLUTION SUBCUTANEOUS
Qty: 2 ML | Refills: 5 | Status: SHIPPED | OUTPATIENT
Start: 2025-08-01 | End: 2026-01-28

## 2025-08-01 NOTE — PROGRESS NOTES
Refill  hydrOXYzine (ATARAX) 25 MG tablet  Please send to [facility] in [place]    Patient would prefer more than a one-month supply this time, if possible.     Call patient back with any issues/questions, [phone].    Subjective   Patient ID: Ju Fernandes is a 39 y.o. female who presents for No chief complaint on file..  HPI  With patient's permission, this is a Telemedicine visit with video and audio. Ju is about 2.5 years post op after a VSG. She has not been seen for follow up for about 18 months. She has maintained a weight loss of 58% of her excess weight. She may occasionally have reflux. She tells me that she is hungry all the time. She will feel full with about 4.5oz. She was told to increase her snacking. She will have 1-2 protein snacks if she is feeling hungry. She is drinking about 100oz of water. She is weight training and walking for exercise. She will exercise almost every day. She is currently taking a bariatric MVI, calcium BID. She will take a vitamin D in the winter. She had labwork with her PCP in Feb, which we reviewed.     Objective   Physical Exam  Constitutional:       Appearance: Normal appearance.   HENT:      Head: Normocephalic.     Eyes:      Pupils: Pupils are equal, round, and reactive to light.     Pulmonary:      Effort: Pulmonary effort is normal.   Abdominal:      Palpations: Abdomen is soft.     Neurological:      General: No focal deficit present.      Mental Status: She is alert and oriented to person, place, and time.     Psychiatric:         Mood and Affect: Mood normal.         Behavior: Behavior normal.        Latest Reference Range & Units 02/20/25 09:00   GLUCOSE 65 - 99 mg/dL 84   SODIUM 135 - 146 mmol/L 139   POTASSIUM 3.5 - 5.3 mmol/L 4.0   CHLORIDE 98 - 110 mmol/L 105   CARBON DIOXIDE 20 - 32 mmol/L 25   ELECTROLYTE BALANCE 7 - 17 mmol/L (calc) 9   UREA NITROGEN (BUN) 7 - 25 mg/dL 14   CREATININE 0.50 - 0.97 mg/dL 0.71   EGFR > OR = 60 mL/min/1.73m2 112   CALCIUM 8.6 - 10.2 mg/dL 9.1   ALBUMIN 3.6 - 5.1 g/dL 4.6   PROTEIN, TOTAL 6.1 - 8.1 g/dL 7.2   ALKALINE PHOSPHATASE 31 - 125 U/L 58   ALT 6 - 29 U/L 15   AST 10 - 30 U/L 16   BILIRUBIN, TOTAL 0.2 - 1.2 mg/dL 0.4    CHOLESTEROL, TOTAL <200 mg/dL 167   HDL CHOLESTEROL > OR = 50 mg/dL 63   CHOL/HDLC RATIO <5.0 (calc) 2.7   LDL-CHOLESTEROL mg/dL (calc) 81   TRIGLYCERIDES <150 mg/dL 133   NON HDL CHOLESTEROL <130 mg/dL (calc) 104   FERRITIN 16 - 154 ng/mL 72   IRON, TOTAL 40 - 190 mcg/dL 82   IRON BINDING CAPACITY 250 - 450 mcg/dL (calc) 288   % SATURATION 16 - 45 % (calc) 28   VITAMIN B12 200 - 1,100 pg/mL 1,287 (H)   COPPER, BLOOD mcg/dL 88   ZINC mcg/dL TNP   PARATHYROID HORMONE, INTACT 16 - 77 pg/mL 38   TSH mIU/L 2.26   WHITE BLOOD CELL COUNT 3.8 - 10.8 Thousand/uL 6.8   RED BLOOD CELL COUNT 3.80 - 5.10 Million/uL 5.19 (H)   HEMOGLOBIN 11.7 - 15.5 g/dL 13.9   HEMATOCRIT 35.0 - 45.0 % 43.3   MCV 80.0 - 100.0 fL 83.4   MCH 27.0 - 33.0 pg 26.8 (L)   MCHC 32.0 - 36.0 g/dL 32.1   RDW 11.0 - 15.0 % 12.9   PLATELET COUNT 140 - 400 Thousand/uL 353   MPV 7.5 - 12.5 fL 10.2   ABSOLUTE NEUTROPHILS 1,500 - 7,800 cells/uL 2,190   ABSOLUTE LYMPHOCYTES 850 - 3,900 cells/uL 2,951   ABSOLUTE MONOCYTES 200 - 950 cells/uL 1,306 (H)   ABSOLUTE EOSINOPHILS 15 - 500 cells/uL 197   ABSOLUTE BASOPHILS 0 - 200 cells/uL 156   NEUTROPHILS % 32.2   LYMPHOCYTES % 43.4   MONOCYTES % 19.2   EOSINOPHILS % 2.9   BASOPHILS % 2.3     Assessment/Plan   Problem List Items Addressed This Visit           ICD-10-CM    Abnormal intestinal absorption (HHS-HCC) - Primary K90.9    Relevant Orders    CBC and Auto Differential    Comprehensive Metabolic Panel    Copper, Blood    Ferritin    Folate    Iron and TIBC    Parathyroid Hormone, Intact    Vitamin B1, Whole Blood    Vitamin B12    Vitamin D 25-Hydroxy,Total (for eval of Vitamin D levels)    Zinc, Serum or Plasma    Hyperlipidemia E78.5    Relevant Medications    semaglutide, weight loss, (Wegovy) 0.25 mg/0.5 mL pen injector    Other Relevant Orders    CBC and Auto Differential    Comprehensive Metabolic Panel    Copper, Blood    Ferritin    Folate    Iron and TIBC    Parathyroid Hormone, Intact    Vitamin B1,  Whole Blood    Vitamin B12    Vitamin D 25-Hydroxy,Total (for eval of Vitamin D levels)    Zinc, Serum or Plasma    Hyperparathyroidism (Multi) E21.3    Relevant Orders    Parathyroid Hormone, Intact    Iron deficiency anemia due to chronic blood loss D50.0    Relevant Orders    Ferritin    Iron and TIBC    Sleep apnea G47.30    Relevant Medications    semaglutide, weight loss, (Wegovy) 0.25 mg/0.5 mL pen injector    Other Relevant Orders    CBC and Auto Differential    Comprehensive Metabolic Panel    Copper, Blood    Ferritin    Folate    Iron and TIBC    Parathyroid Hormone, Intact    Vitamin B1, Whole Blood    Vitamin B12    Vitamin D 25-Hydroxy,Total (for eval of Vitamin D levels)    Zinc, Serum or Plasma    Vitamin D deficiency E55.9    Relevant Orders    Vitamin D 25-Hydroxy,Total (for eval of Vitamin D levels)    S/P gastric sleeve procedure Z90.3    B12 deficiency E53.8    Relevant Orders    Vitamin B12    BMI 32.0-32.9,adult Z68.32    Relevant Medications    semaglutide, weight loss, (Wegovy) 0.25 mg/0.5 mL pen injector     Ju and I reviewed the life long rules. I encouraged her to continue to be active for continued weight loss. She will continue her current supplementation. We discussed using Wegovy to help control her hunger. We reviewed how to take the medication and common side effects. We also reviewed medications if Wegovy is not covered by her insurance. We will recheck her bariatric labwork in 1 year. She will follow up in Nov to assess her weight loss and hunger.     Catalina Velazquez, RICHI-CNP 08/01/25 1:28 PM

## 2025-08-04 RX ORDER — METFORMIN HYDROCHLORIDE 500 MG/1
TABLET, EXTENDED RELEASE ORAL
Qty: 120 TABLET | Refills: 2 | Status: SHIPPED | OUTPATIENT
Start: 2025-08-04

## 2025-08-04 NOTE — TELEPHONE ENCOUNTER
Spoke with patient, name and  verified:     Patient refuses to have a visit with another OMA[facility] while Abdulaziz is out on leave, patient states she does not want to pay for an additional visit and is requesting the alternative medication.

## 2025-08-04 NOTE — TELEPHONE ENCOUNTER
Reviewed note from [facility]ist[facility] visit 8/1/2025. Reviewed patient lab results to evaluate kidney function. We will stop the naltrexone and proceed now with metformin prescription as initial step for medical weight loss. No GLP coverage with RX plan. She will report any new side effects or adverse reaction to treatment. She will follow up with this office in 2-3 months time to evaluate effectiveness of treatment plan.